# Patient Record
Sex: MALE | Race: WHITE
[De-identification: names, ages, dates, MRNs, and addresses within clinical notes are randomized per-mention and may not be internally consistent; named-entity substitution may affect disease eponyms.]

---

## 2018-01-09 ENCOUNTER — HOSPITAL ENCOUNTER (OUTPATIENT)
Dept: HOSPITAL 31 - C.SDS | Age: 53
Discharge: HOME | End: 2018-01-09
Attending: SPECIALIST
Payer: COMMERCIAL

## 2018-01-09 VITALS
OXYGEN SATURATION: 100 % | RESPIRATION RATE: 17 BRPM | TEMPERATURE: 98 F | SYSTOLIC BLOOD PRESSURE: 125 MMHG | HEART RATE: 72 BPM | DIASTOLIC BLOOD PRESSURE: 82 MMHG

## 2018-01-09 DIAGNOSIS — H25.11: Primary | ICD-10-CM

## 2018-01-09 PROCEDURE — 66984 XCAPSL CTRC RMVL W/O ECP: CPT

## 2018-03-06 ENCOUNTER — HOSPITAL ENCOUNTER (OUTPATIENT)
Dept: HOSPITAL 31 - C.SDS | Age: 53
Discharge: HOME | End: 2018-03-06
Attending: SPECIALIST
Payer: COMMERCIAL

## 2018-03-06 VITALS — DIASTOLIC BLOOD PRESSURE: 73 MMHG | HEART RATE: 64 BPM | SYSTOLIC BLOOD PRESSURE: 132 MMHG | TEMPERATURE: 97.8 F

## 2018-03-06 VITALS — OXYGEN SATURATION: 99 %

## 2018-03-06 VITALS — RESPIRATION RATE: 18 BRPM

## 2018-03-06 DIAGNOSIS — I10: ICD-10-CM

## 2018-03-06 DIAGNOSIS — H26.9: Primary | ICD-10-CM

## 2018-03-06 PROCEDURE — 66984 XCAPSL CTRC RMVL W/O ECP: CPT

## 2018-03-06 RX ADMIN — POVIDONE-IODINE ONE ML: 5 SOLUTION OPHTHALMIC at 09:34

## 2018-03-06 RX ADMIN — CARBACHOL ONE ML: 0.1 SOLUTION OPHTHALMIC at 09:33

## 2018-03-06 RX ADMIN — CARBACHOL ONE ML: 0.1 SOLUTION OPHTHALMIC at 10:23

## 2018-03-06 RX ADMIN — SODIUM CHONDROITIN SULFATE / SODIUM HYALURONATE ONE KIT: 0.55-0.5 INJECTION INTRAOCULAR at 09:33

## 2018-03-06 RX ADMIN — SODIUM CHONDROITIN SULFATE / SODIUM HYALURONATE ONE KIT: 0.55-0.5 INJECTION INTRAOCULAR at 10:23

## 2018-03-06 RX ADMIN — TOBRAMYCIN AND DEXAMETHASONE ONE ML: 3; 1 SUSPENSION/ DROPS OPHTHALMIC at 09:33

## 2018-03-06 RX ADMIN — POVIDONE-IODINE ONE ML: 5 SOLUTION OPHTHALMIC at 10:20

## 2018-03-06 RX ADMIN — TOBRAMYCIN AND DEXAMETHASONE ONE ML: 3; 1 SUSPENSION/ DROPS OPHTHALMIC at 10:24

## 2018-03-10 NOTE — OP
PROCEDURE DATE:  03/06/2018



PREOPERATIVE DIAGNOSIS:  Cataract, left eye.



POSTOPERATIVE DIAGNOSIS:  Cataract, left eye.



OPERATIVE PROCEDURE:  Cataract extraction with implant, left eye.



ANESTHESIA TYPE:  Local, standby.



ANESTHESIOLOGIST:



COMPLICATIONS:  None.



PROCEDURE:  Local anesthesia was achieved using a mixture of 1% lidocaine

and Amphadase.



The patient was then prepped and draped in the usual sterile fashion for

ophthalmic surgery.  Betadine drops were placed into the eye.  A lid

speculum was used and a sideport incision was made using a 15 degree blade.

Viscoelastic was used to fill the anterior chamber and a 2.7 millimeter

slit blade was used to create a surgical opening.  Additional viscoelastic

was placed into the eye and a capsulorrhexis was performed. 

Hydrodissection and delineation were then carried out.  Phacoemulsification

of the nucleus was performed with ease and cortical cleanup was achieved

without difficulty.  The capsular bag was refilled using viscoelastic and a

posterior chamber lens was inserted through the existing wound and placed

into the capsular bag and easily centered.



All viscoelastic was then aspirated from the eye and Miochol was instilled

for good symmetric pupillary constriction.  The sideport wound was hydrated

as necessary and a good watertight closure was observed at the conclusion

of the case.  A TobraDex soaked collagen shield was then placed over the

eye.  The lid speculum was removed.  TobraDex ointment was placed onto the

eye and a patch and shield were placed.  The patient tolerated the

procedure well.





__________________________________________

Dillon Ambrose MD





DD:  03/09/2018 19:40:02

DT:  03/09/2018 23:28:11

Job # [id number]

## 2025-03-04 NOTE — OP
PROCEDURE DATE:  01/09/2018



PREOPERATIVE DIAGNOSIS:  Cataract, right eye.



POSTOPERATIVE DIAGNOSIS:  Cataract, right eye.



OPERATIVE PROCEDURE:  Cataract extraction with implant, right eye.



ANESTHESIA TYPE:  Local, standby.



COMPLICATIONS:  None.



PROCEDURE:  Local anesthesia was achieved using a mixture of 1% lidocaine

and Amphadase.



The patient was then prepped and draped in the usual sterile fashion for

ophthalmic surgery.  Betadine drops were placed into the eye.  A lid

speculum was used and a sideport incision was made using a 15 degree blade.

Viscoelastic was used to fill the anterior chamber and a 2.7 millimeter

slit blade was used to create a surgical opening.  Additional viscoelastic

was placed into the eye and a capsulorrhexis was performed. 

Hydrodissection and delineation were then carried out.  Phacoemulsification

of the nucleus was performed with ease and cortical cleanup was achieved

without difficulty.  The capsular bag was refilled using viscoelastic and a

posterior chamber lens was inserted through the existing wound and placed

into the capsular bag and easily centered.



All viscoelastic was then aspirated from the eye and Miochol was instilled

for good symmetric pupillary constriction.  The sideport wound was hydrated

as necessary and a good watertight closure was observed at the conclusion

of the case.  A TobraDex soaked collagen shield was then placed over the

eye.  The lid speculum was removed.  TobraDex ointment was placed onto the

eye and a patch and shield were placed.  The patient tolerated the

procedure well.











__________________________________________

Dillon Ambrose MD



DD:  01/15/2018 14:11:42

DT:  01/15/2018 21:39:34

Job # [id number] Refill request: Clobetasol foam  Last refilled: 6/3/2024  quantity:  100 mg  refills:  1  Sig:  Apply topically as needed to scalp for flares, up to 2-3 times weekly.       Last office visit:  4/22/2024    Follow Up:  4/21/2025    Left message for patient to call office regarding questions below. Will await call back.    Is the patient still using the prescription?     How often is the patient using the prescription?     Is the prescription effective for the patient?     LOV Note:  Sebopsoriasis, scalp  Description: By history; clear today  Plan:              - Continue ketoconazole 2% shampoo; apply to scalp, leave in 2-3 minutes, and rinse out completely. Frequency based on severity. Recommend using this as a face wash as well.              - Continue clobetasol (OLUX) 0.05 % foam; Apply topically to scalp BID PRN for flares. Avoid applying to face. Discussed potential adverse effects of topical corticosteroids including but not limited to dyspigmentation, atrophy, striae and telangiectasia.